# Patient Record
(demographics unavailable — no encounter records)

---

## 2024-10-18 NOTE — CONSULT LETTER
[Dear  ___] : Dear  [unfilled], [Courtesy Letter:] : I had the pleasure of seeing your patient, [unfilled], in my office today. [Please see my note below.] : Please see my note below. [Sincerely,] : Sincerely, [FreeTextEntry3] : Katie Friend CPNP Certified Pediatric Nurse Practitioner  Pediatric Neurology  [facility]  Reva Epstein MD Attending, Pediatric Neurology  [facility]

## 2024-10-18 NOTE — REASON FOR VISIT
[Follow-Up Evaluation] : a follow-up evaluation for [Seizure Disorder] : seizure disorder [Family Member] : family member [Mother] : mother

## 2024-10-18 NOTE — ASSESSMENT
[FreeTextEntry1] : 12 yo M w/ Absence epilepsy  BID, still having aprox 1 episode/day, captured in recent AEEG.  Will add  BID and check labs in 3-4 weeks

## 2024-10-18 NOTE — QUALITY MEASURES
[Seizure frequency] : Seizure frequency: Yes [Etiology, seizure type, and epilepsy syndrome] : Etiology, seizure type, and epilepsy syndrome: Yes [Side effects of anti-seizure medications] : Side effects of anti-seizure medications: Yes [Safety and education around seizures] : Safety and education around seizures: Yes [Sudden unexpected death in epilepsy (SUDEP)] : Sudden unexpected death in epilepsy: Not Applicable [Issues around driving] : Issues around driving: Not Applicable [Screening for anxiety, depression] : Screening for anxiety, depression: Yes [Treatment-resistant epilepsy (every visit)] : Treatment-resistant epilepsy (every visit): Not Applicable [Adherence to medication(s)] : Adherence to medication(s): Yes [Counseling for women of childbearing potential with epilepsy (including folic acid supplement)] : Counseling for women of childbearing potential with epilepsy (including folic acid supplement): Not Applicable [Options for adjunctive therapy (Neurostimulation, CBD, Dietary Therapy, Epilepsy Surgery)] : Options for adjunctive therapy (Neurostimulation, CBD, Dietary Therapy, Epilepsy Surgery): Not Applicable [25 Hydroxy Vitamin D level assessed and Vitamin D3 ordered] : 25 Hydroxy Vitamin D level assessed and Vitamin D3 ordered: Yes [Thyroid profile ordered] : Thyroid profile ordered: Not Applicable

## 2024-10-18 NOTE — PHYSICAL EXAM
[Well-appearing] : well-appearing [Normocephalic] : normocephalic [No dysmorphic facial features] : no dysmorphic facial features [No ocular abnormalities] : no ocular abnormalities [Neck supple] : neck supple [No abnormal neurocutaneous stigmata or skin lesions] : no abnormal neurocutaneous stigmata or skin lesions [No deformities] : no deformities [Alert] : alert [Well related, good eye contact] : well related, good eye contact [Conversant] : conversant [Normal speech and language] : normal speech and language [Follows instructions well] : follows instructions well [Pupils reactive to light and accommodation] : pupils reactive to light and accommodation [Full extraocular movements] : full extraocular movements [Saccadic and smooth pursuits intact] : saccadic and smooth pursuits intact [No nystagmus] : no nystagmus [No papilledema] : no papilledema [Normal facial sensation to light touch] : normal facial sensation to light touch [No facial asymmetry or weakness] : no facial asymmetry or weakness [Gross hearing intact] : gross hearing intact [Equal palate elevation] : equal palate elevation [Good shoulder shrug] : good shoulder shrug [Normal tongue movement] : normal tongue movement [Midline tongue, no fasciculations] : midline tongue, no fasciculations [Normal axial and appendicular muscle tone] : normal axial and appendicular muscle tone [Gets up on table without difficulty] : gets up on table without difficulty [No pronator drift] : no pronator drift [Normal finger tapping and fine finger movements] : normal finger tapping and fine finger movements [No abnormal involuntary movements] : no abnormal involuntary movements [5/5 strength in proximal and distal muscles of arms and legs] : 5/5 strength in proximal and distal muscles of arms and legs [Walks and runs well] : walks and runs well [Able to do deep knee bend] : able to do deep knee bend [Able to walk on heels] : able to walk on heels [Able to walk on toes] : able to walk on toes [2+ biceps] : 2+ biceps [Knee jerks] : knee jerks [Ankle jerks] : ankle jerks [No ankle clonus] : no ankle clonus [Localizes LT and temperature] : localizes LT and temperature [No dysmetria on FTNT] : no dysmetria on FTNT [Good walking balance] : good walking balance [Normal gait] : normal gait [Able to tandem well] : able to tandem well [Negative Romberg] : negative Romberg [de-identified] : no distress

## 2024-10-18 NOTE — HISTORY OF PRESENT ILLNESS
[FreeTextEntry1] : 13 yo M w/ Absence epilepsy  BID and  BID here for f/u. Last seen May 2024    HPI Mother reports that since 7 years old he has been having episodes of staring and eye rolling.  Episodes can happen multiple times an hour.   Javy and parents recently relocated from [facility] to [place].  Since attending school in [place] teachers have had concerns for eye rolling episodes.   No myoclonic jerking or full GTC.   He is currently in 5th grade in an ESL class.  Mother notes he overall does well academically but still struggling with English language.    rEEG 09/28/23: generalized 3Hz SW discharges and 2 electroclinical seizures consistent of  generalized 3Hz SW discharges lasting 5 seconds associated with brief eye flutter. Episodes are consistent with absence seizures.  PMHX unremarkable.  FHx- No neurological issues in the family  INTERVAL HX - On  BID and  BID. No side effects.  - Staring episodes___ - AEEG 5/2024: Multiple 3-3.5 hz generalized SW discharges lasting 3-5 seconds. One pushed button event occurred in proximity to one of the discharges.

## 2024-10-18 NOTE — PLAN
[FreeTextEntry1] : [] Cont Ethosuximide 500mg BID.   []  qhs x 1 week--> 500 BID [] Vit D 1000 IU daily [] VPA labs (VPA, CBC, Liver, Vit D) in 3-4 weeks [] F/U 1 month

## 2024-10-18 NOTE — BIRTH HISTORY
[At ___ Weeks Gestation] : at [unfilled] weeks gestation [Other: ________] : in [unfilled] [None] : there were no delivery complications [FreeTextEntry6] : None

## 2025-03-31 NOTE — ASSESSMENT
[FreeTextEntry1] : 13 yo M w/ Absence epilepsy  BID and  BID, still having daily staring events  No myoclonic jerks or GTCs.  Irregular compliance with VPA.   Will check labs and levels today before adjusting dose.  Reinforced importance of compliance.

## 2025-03-31 NOTE — PHYSICAL EXAM
[Well-appearing] : well-appearing [Normocephalic] : normocephalic [No dysmorphic facial features] : no dysmorphic facial features [No ocular abnormalities] : no ocular abnormalities [Neck supple] : neck supple [No abnormal neurocutaneous stigmata or skin lesions] : no abnormal neurocutaneous stigmata or skin lesions [No deformities] : no deformities [Alert] : alert [Well related, good eye contact] : well related, good eye contact [Conversant] : conversant [Normal speech and language] : normal speech and language [Follows instructions well] : follows instructions well [Pupils reactive to light and accommodation] : pupils reactive to light and accommodation [Full extraocular movements] : full extraocular movements [Saccadic and smooth pursuits intact] : saccadic and smooth pursuits intact [No nystagmus] : no nystagmus [No papilledema] : no papilledema [Normal facial sensation to light touch] : normal facial sensation to light touch [No facial asymmetry or weakness] : no facial asymmetry or weakness [Gross hearing intact] : gross hearing intact [Equal palate elevation] : equal palate elevation [Good shoulder shrug] : good shoulder shrug [Normal tongue movement] : normal tongue movement [Midline tongue, no fasciculations] : midline tongue, no fasciculations [Normal axial and appendicular muscle tone] : normal axial and appendicular muscle tone [Gets up on table without difficulty] : gets up on table without difficulty [No pronator drift] : no pronator drift [Normal finger tapping and fine finger movements] : normal finger tapping and fine finger movements [No abnormal involuntary movements] : no abnormal involuntary movements [5/5 strength in proximal and distal muscles of arms and legs] : 5/5 strength in proximal and distal muscles of arms and legs [Walks and runs well] : walks and runs well [Able to do deep knee bend] : able to do deep knee bend [Able to walk on heels] : able to walk on heels [Able to walk on toes] : able to walk on toes [2+ biceps] : 2+ biceps [Knee jerks] : knee jerks [Ankle jerks] : ankle jerks [No ankle clonus] : no ankle clonus [Localizes LT and temperature] : localizes LT and temperature [No dysmetria on FTNT] : no dysmetria on FTNT [Good walking balance] : good walking balance [Normal gait] : normal gait [Able to tandem well] : able to tandem well [Negative Romberg] : negative Romberg [de-identified] : no distress

## 2025-03-31 NOTE — CONSULT LETTER
[Dear  ___] : Dear  [unfilled], [Courtesy Letter:] : I had the pleasure of seeing your patient, [unfilled], in my office today. [Please see my note below.] : Please see my note below. [Sincerely,] : Sincerely, [FreeTextEntry3] : Katie Friend CPNP Certified Pediatric Nurse Practitioner  Pediatric Neurology  [facility]  Reva Epstein MD Attending, Pediatric Neurology  [facility]

## 2025-03-31 NOTE — HISTORY OF PRESENT ILLNESS
[FreeTextEntry1] : 13 yo M w/ absences (staring/ eye fluttering) on ETX here for f/u. Last seen in Jan 2024. At that time, still reporting occasional episodes despite 500 BID ETX. Decided to repeat AEEG to confirm ongoing seizures before considering adding VPA  HPI Mother reports that since 7 years old he has been having episodes of staring and eye rolling.  Episodes can happen multiple times an hour.   Javy and parents recently relocated from [facility] to [place].  Since attending school in [place] teachers have had concerns for eye rolling episodes.   No myoclonic jerking or full GTC.   rEEG 09/28/23: generalized 3Hz SW discharges and 2 electroclinical seizures consistent of  generalized 3Hz SW discharges lasting 5 seconds associated with brief eye flutter. Episodes are consistent with absence seizures.  AEEG 5/2024: Multiple 3-3.5 hz generalized SW discharges lasting 3-5 seconds. One pushed button event occurred in proximity to one of the discharges.   PMHX unremarkable.  FHx- No neurological issues in the family  INTERVAL HX - On  BID and  BID still having daily episodes of staring.  Denies GTCs or myoclonic jerks.  - Forgets VPA sometimes. Wants to try with sprinkles - does not like the VPA pill.